# Patient Record
(demographics unavailable — no encounter records)

---

## 2024-12-02 NOTE — PHYSICAL EXAM
[No Acute Distress] : no acute distress [Well Nourished] : well nourished [Well Developed] : well developed [Well-Appearing] : well-appearing [Normal Sclera/Conjunctiva] : normal sclera/conjunctiva [PERRL] : pupils equal round and reactive to light [EOMI] : extraocular movements intact [Normal Outer Ear/Nose] : the outer ears and nose were normal in appearance [Normal Oropharynx] : the oropharynx was normal [No JVD] : no jugular venous distention [No Lymphadenopathy] : no lymphadenopathy [Supple] : supple [Thyroid Normal, No Nodules] : the thyroid was normal and there were no nodules present [No Respiratory Distress] : no respiratory distress  [No Accessory Muscle Use] : no accessory muscle use [Clear to Auscultation] : lungs were clear to auscultation bilaterally [Normal Rate] : normal rate  [Regular Rhythm] : with a regular rhythm [Normal S1, S2] : normal S1 and S2 [No Murmur] : no murmur heard [No Carotid Bruits] : no carotid bruits [No Abdominal Bruit] : a ~M bruit was not heard ~T in the abdomen [No Varicosities] : no varicosities [Pedal Pulses Present] : the pedal pulses are present [No Edema] : there was no peripheral edema [No Palpable Aorta] : no palpable aorta [No Extremity Clubbing/Cyanosis] : no extremity clubbing/cyanosis [Normal Appearance] : normal in appearance [Soft] : abdomen soft [Non Tender] : non-tender [Non-distended] : non-distended [No Masses] : no abdominal mass palpated [No HSM] : no HSM [Normal Bowel Sounds] : normal bowel sounds [Normal Posterior Cervical Nodes] : no posterior cervical lymphadenopathy [Normal Anterior Cervical Nodes] : no anterior cervical lymphadenopathy [No CVA Tenderness] : no CVA  tenderness [No Spinal Tenderness] : no spinal tenderness [No Joint Swelling] : no joint swelling [Grossly Normal Strength/Tone] : grossly normal strength/tone [No Rash] : no rash [Coordination Grossly Intact] : coordination grossly intact [No Focal Deficits] : no focal deficits [Normal Gait] : normal gait [Deep Tendon Reflexes (DTR)] : deep tendon reflexes were 2+ and symmetric [Normal Affect] : the affect was normal [Normal Insight/Judgement] : insight and judgment were intact [de-identified] : dense tissue medially bl

## 2024-12-02 NOTE — HEALTH RISK ASSESSMENT
[0] : 2) Feeling down, depressed, or hopeless: Not at all (0) [PHQ-2 Negative - No further assessment needed] : PHQ-2 Negative - No further assessment needed [Never] : Never [[id number]] : 0

## 2024-12-02 NOTE — HISTORY OF PRESENT ILLNESS
[FreeTextEntry1] : cpe [de-identified] : 1. Had pvcs had holter and echo. saw cardiologist. all stable and rec nothing new to do. mild r phtn 2. had breast reduction surgery 13y ago. found hematoma on mammogram/us has annual mammo and us since then showing stability including march 2023. not int in mri or seeing breast specialist. 3. since wt loss surgery has been regaining weight 5lb/y could eat better. due for vitamin levels 4. depression doing well on zoloft and clonipin does not want to change 5. utd ling, pap, colo, dexa. utd ophtho and dnetist. declines vaccines. utd tdap. declines pcv20. 6. l knee pain intermittently hurts  not related to exercise.  7 R shoulder. hunts sometimes.  dx rot cuff tendinopathy she will strat pt  8 had to take relpax once this month works well. happened ater not sleeping well one night 9. thyroid nodule due for fu in jan. 10. not exerciseing planning to start 11. notes mild tremor at night bl hands her mom and aunts had this. declines neuro fu

## 2024-12-02 NOTE — PLAN
[FreeTextEntry1] : including march 2023. not int in mri or seeing breast specialist. 3. hx wt loss surgery discused constipation avoidance w fiber, water. has gi fu. cont surg fu. rec meal planning, food log, due for colo.  4. depression doing well on zoloft and clonipin does not want to change 5. utd ling, pap, colo, dexa. utd ophtho and dnetist. declines vaccines. utd tdap. declines pcv20. 6. l knee oa rec pt  7 R shoulder likely rot cuff tendinopathy will start pt  8 migraine log. call if worsening avoid triggers use relpax prn  9. thyroid nodule due for fu in jan. 10. rec resume xercise 11. mild tremor at night bl hands her mom and aunts had this. declines neuro fu labs and vitamin levels today.

## 2025-03-20 NOTE — HISTORY OF PRESENT ILLNESS
[FreeTextEntry1] : Office consultation on 3/19/2025.  The patient is a 69-year-old woman evaluated for consultation in preparation for a follow-up colonoscopy and for evaluation of episodic abdominal discomfort.  PMD: Dr. Jerrica De La Cruz.  Previously evaluated for office consultation on 2018.  INITIAL CONSULTATION NOTE (2018):  Patient is a 62-year-old woman who presents in preparation for screening colonoscopy.  Patient has one formed bowel movement daily without any current complaints of diarrhea, constipation or change in bowel habit. On occasion experiences mild constipation described as hard stool or straining. May observe scant red blood in association with constipation. However, as noted, no current complaints of constipation reported.  Appetite and weight are stable. Denies current complaints of dysphagia, heartburn, nausea, vomiting or abdominal pain.  Previously experienced intermittent complaints of left lower quadrant abdominal pain. Previously underwent CAT scan examination of the abdomen as well as colonoscopy. Non diagnostic studies. Diverticulosis noted.  Medical history noted for mild primary pulmonary hypertension not requiring medical therapy. Remote history of DVT. History of von Willebrand's disease and excessive bleeding following major surgery. Patient will take DDAVP as a precaution before major surgery.  Previous colonoscopy was 6 years ago which patient reports was normal. Per patient-records not available. Family history of colon cancer reported with father being diagnosed at age 80.  CURRENT HISTORY:  COLONOSCOPY 3/29/2018:     -Screening colonoscopy was performed on 3/29/18. Family history of colon cancer is reported with patient's father having been diagnosed at age 80. Total colonoscopy was performed to the cecum with ileoscopy. Normal terminal ileum. Marked sigmoid colon and descending colon diverticulosis was noted with associated sigmoid muscular hypertrophy and luminal narrowing. A single proximal transverse colon diverticulum was noted. The colonoscopy examination was otherwise normal. A followup screening colonoscopy is advised in 5 years.  EGD 2021:     -EGD was performed on 2021 as part of a prebariatric surgery evaluation. Examination of the esophagus was normal except for a small 1 cm hiatus hernia. There was no erosive esophagitis or Rehman's esophagus. Examination of the stomach was noted for 5 small gastric body polyps ranging in size from 2 to 4 mm with evaluation consistent with fundic gland polyp. Minimal gastric antrum erythema was noted. Gastric antrum biopsy revealed chronic inactive gastritis and erosive gastropathy. Gastric body biopsy revealed chronic inactive gastritis. Testing was negative for Helicobacter pylori. Gastric intestinal metaplasia was not detected. The visualized duodenum to the second portion was normal. There were no findings on EGD that precludes patient from proceeding with bariatric surgery.  CONSULTATION 3/19/2025:     -Evaluated for consultation regarding episodic abdominal discomfort and in preparation for screening colonoscopy. --Sleeve gastrectomy performed 2021.  Subsequently lost 60 pounds but regained 10 pounds back.  Operative note of 2021 indicates operation formed was laparoscopic vertical sleeve gastrectomy, resection of periesophageal lipoma and hiatal hernia repair. --Starting approximately 1 year after sleeve gastrectomy has experienced episodic complaints of nausea, nonbloody and nonbilious emesis and sense of abdominal spasm and retching ultimately relieved after bowel evacuation.  These episodes occur approximately once per month.  Precipitating factors such as any relationship to eating or particular food not reported.  Typical episode described as onset of nausea in association with hypersalivation and feeling of retching.  Approximately 50% of the time will have associated nonbloody and nonbilious emesis.  Has sense of "spasm" in abdomen but indicates this is more of the issue of retching and she denies abdominal pain, borborygmi, abdominal bloating or abdominal distention.  Indicates an abrupt onset of this complaint.  Duration variable.  Ultimately after having a bowel movement which is typically a normal stool and not diarrhea she will indicate relief.  Indicates that 5 minutes after bowel movement symptoms resolved.  Reports no complaints of dizziness, vertigo, headache or migraine symptoms at time of these episodes. Patient indicates that bariatric surgeon suspects accentuated gastrocolic reflex as cause of this symptom. --Experienced onset of constipation following sleeve gastrectomy.  Prior to bariatric surgery she indicates having had "normal stools" without issues of diarrhea or constipation.  Following the sleeve gastrectomy she had decreased frequency of bowel movements with passage of scybalous Rutherford 1 and Rutherford 2 stools in association with abdominal discomfort.  Past 2 years she has been on a regimen of MiraLAX 17 g in conjunction with fiber Gummies.  Reports significant symptomatic improvement and mostly has daily formed Rutherford 4 bowel movements on this regimen. --Appetite stable.  As noted, lost 60 pounds after sleeve gastrectomy but gained 10 pounds back.  Denies complaints of dysphagia.  Can have occasional heartburn approximately once per week but does not indicate this is much of a problem.  Denies complaints of nausea or vomiting (except as noted above).  Denies abdominal pain.  As noted above on current bowel regimen typically has 1 and occasionally 2 formed Rutherford 4 bowel movements daily without any breakthrough diarrhea, constipation or rectal bleeding. --PMH: ---- Past medical history: Migraine, thyroid nodule, mild pulmonary hypertension, bleeding tendency. ---- Operations: Gastric sleeve  (indicates associated hiatal hernia repair), breast reduction, tonsillectomy. ---- Allergies: Ampicillin. ---- Family history: Father  age 95; colon cancer diagnosed in his 80s but he also had prostate and bladder cancer. ---- Mother  age 98.  Patient has 1 brother.  Maternal cousin had colon cancer. ---- Social history: Tobacco-none.  Alcohol-none.  Drinks decaf.  .  Patient has 3 daughters. --- Medications: Zoloft 25 mg, Klonopin 0.5 mg at bedtime, candesartan 8 mg.

## 2025-03-20 NOTE — REASON FOR VISIT
[Consultation] : a consultation visit [FreeTextEntry1] : for evaluation of episodic abdominal discomfort and in preparation for follow-up colonoscopy.

## 2025-03-20 NOTE — ADDENDUM
[FreeTextEntry1] : Plan:  #1 current clinical status from GI standpoint reviewed with the patient.  Colon cancer screening options discussed.  Issue of chronic constipation as well as episodic nausea, hypersalivation and vomiting discussed.  #2 colon cancer screening options discussed.  Options of colonoscopy, annual FIT as well as Cologuard reviewed.  Pros/cons as well as R/B/A discussed.  In view of family history of colon cancer decision made to proceed with screening colonoscopy.  I had an extensive discussion with the patient including risks, benefits and alternatives possibly including bleeding, perforation, need for blood transfusion or surgery, inadvertent injury to contiguous organs including spleen, acute colitis, infection and medication and anesthetic risk.  The limitations of colonoscopy were discussed including missed polyps and cancer.  Possible medication and anesthesia related adverse cardiovascular and respiratory reactions were reviewed.  The patient wishes to proceed and will be scheduled for a screening colonoscopy.  The rationale of colonoscopy was discussed not only in terms of the early detection of colon cancer but also as a means of prevention in the event of removal of a polyp.  The limitations of colonoscopy were discussed and the patient is aware that not every cancer is prevented.  The patient will utilize the Suprep colonoscopy preparation in split fashion and the administration of this product was discussed.  #3 favorable results of current bowel regimen discussed.  Continue current regimen of MiraLAX 17 g daily in conjunction with fiber Gummies.  #4 unexplained episodic complaint of acute onset brief duration nausea, hypersalivation and occasional vomiting discussed.  Etiology unclear.  Patient will keep me informed as to clinical status and as to whether his episodes are increasing in frequency or severity.

## 2025-03-20 NOTE — ASSESSMENT
[FreeTextEntry1] : Impression:  #1 episodic nausea and vomiting-past 2 years acute onset episodes of nausea, hypersalivation and occasional vomiting with associated retching.  Temporally related to sleeve gastrectomy (although symptom onset 1 year after surgery).  Etiology unclear.  #2 chronic constipation-onset temporally related to sleeve gastrectomy.  Favorable response to current regimen of MiraLAX 17 g daily in conjunction with fiber gummy (reports daily formed Franklin 4 bowel movements on this regimen).  #3 colon cancer screening-rule out colonic neoplasm.  Patient has one first-degree relative with colon cancer which poses some increased risk of colorectal neoplasia.  #4 diverticulosis- colonoscopy 3/29/2018 noted for marked sigmoid and descending colon diverticulosis with associated sigmoid muscular hypertrophy and luminal narrowing.  Single proximal transverse colon diverticulum noted at that examination.  #5 history hiatus hernia-EGD 4/22/2021 noted for 1 cm hiatus hernia.  Subsequently repaired at time of sleeve gastrectomy.  #6 gastric fundic gland polyps-EGD 4/2021 noted for 5 small 2 to 4 mm gastric body fundic gland polyps.  #7 history of obesity-status post laparoscopic vertical sleeve gastrectomy, resection of periesophageal lipoma and hiatal hernia repair 5/19/2021.  General medical problems:  # primary pulmonary hypertension-reported as mild and possibly attributed to Fen/Phen therapy.  # Von Willebrand's disease.  # migraine.  # history DVT.  # thyroid nodule.  # status post breast reduction.  # status post tonsillectomy.

## 2025-03-20 NOTE — CONSULT LETTER
[Dear  ___] : Dear  [unfilled], [Consult Letter:] : I had the pleasure of evaluating your patient, [unfilled]. [( Thank you for referring [unfilled] for consultation for _____ )] : Thank you for referring [unfilled] for consultation for [unfilled] [Please see my note below.] : Please see my note below. [Consult Closing:] : Thank you very much for allowing me to participate in the care of this patient.  If you have any questions, please do not hesitate to contact me. [Sincerely,] : Sincerely, [FreeTextEntry2] : Dr. Jerrica De La Cruz [FreeTextEntry3] : Donaldo Bang MD

## 2025-03-20 NOTE — PHYSICAL EXAM
[Alert] : alert [Normal Voice/Communication] : normal voice/communication [Healthy Appearing] : healthy appearing [No Acute Distress] : no acute distress [Sclera] : the sclera and conjunctiva were normal [Normal Appearance] : the appearance of the neck was normal [No Respiratory Distress] : no respiratory distress [No Acc Muscle Use] : no accessory muscle use [Respiration, Rhythm And Depth] : normal respiratory rhythm and effort [Auscultation Breath Sounds / Voice Sounds] : lungs were clear to auscultation bilaterally [Heart Rate And Rhythm] : heart rate was normal and rhythm regular [Normal S1, S2] : normal S1 and S2 [Murmurs] : no murmurs [Bowel Sounds] : normal bowel sounds [Abdomen Tenderness] : non-tender [No Masses] : no abdominal mass palpated [Abdomen Soft] : soft [] : no hepatosplenomegaly [Abnormal Walk] : normal gait [No Clubbing, Cyanosis] : no clubbing or cyanosis of the fingernails [Normal Color / Pigmentation] : normal skin color and pigmentation [Oriented To Time, Place, And Person] : oriented to person, place, and time [Normal Affect] : the affect was normal [Normal Insight/Judgment] : insight and judgment were intact [Normal Mood] : the mood was normal